# Patient Record
Sex: MALE | Race: WHITE
[De-identification: names, ages, dates, MRNs, and addresses within clinical notes are randomized per-mention and may not be internally consistent; named-entity substitution may affect disease eponyms.]

---

## 2017-11-14 ENCOUNTER — HOSPITAL ENCOUNTER (OUTPATIENT)
Dept: HOSPITAL 25 - ED | Age: 22
Setting detail: OBSERVATION
LOS: 1 days | Discharge: HOME | End: 2017-11-15
Attending: INTERNAL MEDICINE | Admitting: INTERNAL MEDICINE
Payer: COMMERCIAL

## 2017-11-14 DIAGNOSIS — R11.2: ICD-10-CM

## 2017-11-14 DIAGNOSIS — R07.9: ICD-10-CM

## 2017-11-14 DIAGNOSIS — I31.9: Primary | ICD-10-CM

## 2017-11-14 DIAGNOSIS — R06.02: ICD-10-CM

## 2017-11-14 DIAGNOSIS — Z79.899: ICD-10-CM

## 2017-11-14 LAB
ALBUMIN SERPL BCG-MCNC: 4 G/DL (ref 3.2–5.2)
ALP SERPL-CCNC: 62 U/L (ref 34–104)
ALT SERPL W P-5'-P-CCNC: 21 U/L (ref 7–52)
ANION GAP SERPL CALC-SCNC: 8 MMOL/L (ref 2–11)
AST SERPL-CCNC: 99 U/L (ref 13–39)
BUN SERPL-MCNC: 9 MG/DL (ref 6–24)
BUN/CREAT SERPL: 13.4 (ref 8–20)
CALCIUM SERPL-MCNC: 10 MG/DL (ref 8.6–10.3)
CHLORIDE SERPL-SCNC: 99 MMOL/L (ref 101–111)
GLOBULIN SER CALC-MCNC: 3.8 G/DL (ref 2–4)
GLUCOSE SERPL-MCNC: 86 MG/DL (ref 70–100)
HCO3 SERPL-SCNC: 27 MMOL/L (ref 22–32)
HCT VFR BLD AUTO: 43 % (ref 42–52)
HGB BLD-MCNC: 14.5 G/DL (ref 14–18)
LIPASE SERPL-CCNC: 21 U/L (ref 11–82)
MCH RBC QN AUTO: 29 PG (ref 27–31)
MCHC RBC AUTO-ENTMCNC: 34 G/DL (ref 31–36)
MCV RBC AUTO: 84 FL (ref 80–94)
POTASSIUM SERPL-SCNC: 4 MMOL/L (ref 3.5–5)
PROT SERPL-MCNC: 7.8 G/DL (ref 6.4–8.9)
RBC # BLD AUTO: 5.05 10^6/UL (ref 4–5.4)
SODIUM SERPL-SCNC: 134 MMOL/L (ref 133–145)
TROPONIN I SERPL-MCNC: 10.45 NG/ML (ref ?–0.04)
WBC # BLD AUTO: 10.5 10^3/UL (ref 3.5–10.8)

## 2017-11-14 PROCEDURE — 83605 ASSAY OF LACTIC ACID: CPT

## 2017-11-14 PROCEDURE — 99291 CRITICAL CARE FIRST HOUR: CPT

## 2017-11-14 PROCEDURE — 86140 C-REACTIVE PROTEIN: CPT

## 2017-11-14 PROCEDURE — 87502 INFLUENZA DNA AMP PROBE: CPT

## 2017-11-14 PROCEDURE — 96361 HYDRATE IV INFUSION ADD-ON: CPT

## 2017-11-14 PROCEDURE — 93005 ELECTROCARDIOGRAM TRACING: CPT

## 2017-11-14 PROCEDURE — 85379 FIBRIN DEGRADATION QUANT: CPT

## 2017-11-14 PROCEDURE — 80053 COMPREHEN METABOLIC PANEL: CPT

## 2017-11-14 PROCEDURE — 81015 MICROSCOPIC EXAM OF URINE: CPT

## 2017-11-14 PROCEDURE — 96374 THER/PROPH/DIAG INJ IV PUSH: CPT

## 2017-11-14 PROCEDURE — 85025 COMPLETE CBC W/AUTO DIFF WBC: CPT

## 2017-11-14 PROCEDURE — G0378 HOSPITAL OBSERVATION PER HR: HCPCS

## 2017-11-14 PROCEDURE — 83690 ASSAY OF LIPASE: CPT

## 2017-11-14 PROCEDURE — 36415 COLL VENOUS BLD VENIPUNCTURE: CPT

## 2017-11-14 PROCEDURE — 84484 ASSAY OF TROPONIN QUANT: CPT

## 2017-11-14 PROCEDURE — 93306 TTE W/DOPPLER COMPLETE: CPT

## 2017-11-14 PROCEDURE — 81003 URINALYSIS AUTO W/O SCOPE: CPT

## 2017-11-14 RX ADMIN — COLCHICINE SCH MG: 0.6 TABLET, FILM COATED ORAL at 22:20

## 2017-11-14 RX ADMIN — IBUPROFEN SCH MG: 600 TABLET, FILM COATED ORAL at 21:13

## 2017-11-14 NOTE — ED
Lizzy DOHERTY Thomas, scribed for Raf Valencia MD on 11/14/17 at 1534 .





HPI Chest Pain





- HPI Summary


HPI Summary: 





The pt is a 22 y/o M presenting to the ED c/o intermittent waves of chest pain 

that began two days ago. He has mild chest pain in the ED rated 3/10. The pain 

is rated 3/10. He describes the chest pain as similar to the chest pains he 

experienced when he was diagnosed with pericarditis two years ago. The pain is 

aggravated by lying down. The pain is alleviated by sitting up, which is 

similar to his previous pericarditis. The patient has treated the pain with 

nothing PTA. Pt additionally c/o N/V and mild SOB. The patient developed 

myalgia and fatigue four days ago. He is on vyvanse. 





- History of Current Complaint


Chief Complaint: EDChestWallPain


Time Seen by Provider: 11/14/17 15:12


Hx Obtained From: Patient


Onset/Duration: Started Days Ago - 2, Still Present


Timing: Intermittent


Initial Severity: Moderate


Current Severity: Mild


Pain Intensity: 3


Pain Scale Used: 0-10 Numeric


Character: Other: - Simliar to prior pericarditis


Aggravating Factor(s): Other: - Lying down


Alleviating Factor(s): Other: - Sitting up


Associated Signs and Symptoms: Positive: Chest Pain, Shortness of Breath - mild

, Nausea, Vomiting, Other: - Myalgia, fatigue


Related History: Similar Episode/Dx as: - pericarditis two years ago





- Additional Pertinent History


Primary Care Physician: [id number]





- Allergy/Home Medications


Allergies/Adverse Reactions: 


 Allergies











Allergy/AdvReac Type Severity Reaction Status Date / Time


 


No Known Allergies Allergy   Verified 11/14/17 17:25














PMH/Surg Hx/FS Hx/Imm Hx


Previously Healthy: No


Endocrine/Hematology History: 


   Denies: Hx Diabetes


Cardiovascular History: Reports: Other Cardiovascular Problems/Disorders - Hx 

pericarditis


Sensory History: Reports: Hx Contacts or Glasses


Opthamlomology History: Reports: Hx Contacts or Glasses


Neurological History: Reports: Other Neuro Impairments/Disorders - Mild 

concussion


Infectious Disease History: No


Infectious Disease History: 


   Denies: Traveled Outside the [place] in Last 30 Days





- Family History


Known Family History: Positive: Other - Unspecified heart disease, sister





- Social History


Occupation: Student


Lives: Dormitory/Roommates


Alcohol Use: Weekly


Hx Substance Use: No


Substance Use Type: Reports: None


Hx Tobacco Use: No


Smoking Status (MU): Never Smoked Tobacco





Review of Systems


Positive: Fatigue.  Negative: Fever, Chills


Negative: Erythema - eyes


Negative: Sore Throat


Positive: Chest Pain


Positive: Shortness Of Breath - mild


Positive: Vomiting, Nausea.  Negative: Abdominal Pain


Negative: dysuria, hematuria


Positive: Myalgia.  Negative: Edema - legs


Negative: Rash


Neurological: Other - NEGATIVE: dizziness


All Other Systems Reviewed And Are Negative: Yes





Physical Exam





- Summary


Physical Exam Summary: 





Constitutional: Well-developed, Well-nourished, Alert. (-) Distressed


Skin: Warm, Dry


HENT: Normocephalic; Atraumatic 


Eyes: Conjunctiva normal


Neck: Musculoskeletal ROM normal neck. (-) JVD, (-) Stridor, (-) Tracheal 

deviation


Cardio: He has a faint systolic murmur, 2/5. There is a pericardial friction 

rub. Rhythm regular, rate normal, Intact distal pulses; The pedal pulses are 2+ 

and symmetric. Radial pulses are 2+ and symmetric.


Pulmonary/Chest wall: Effort normal. (-) Respiratory distress, (-) Wheezes, (-) 

Rales


Abd: Soft, (-) Tenderness, (-) Distension, (-) Guarding, (-) Rebound


Musculoskeletal: (-) Edema


Lymph: (-) Cervical adenopathy


Neuro: Alert, Oriented x3


Psych: Mood and affect Normal


Triage Information Reviewed: Yes


Vital Signs On Initial Exam: 


 Initial Vitals











Temp Pulse Resp BP Pulse Ox


 


 97.8 F   78   20   120/61   97 


 


 11/14/17 14:52  11/14/17 14:52  11/14/17 14:52  11/14/17 14:52  11/14/17 14:52











Vital Signs Reviewed: Yes





- Thurmond Coma Scale


Coma Scale Total: 15





Diagnostics





- Vital Signs


 Vital Signs











  Temp Pulse Resp BP Pulse Ox


 


 11/14/17 15:07   76  18  101/75  97


 


 11/14/17 15:04   76  16   98


 


 11/14/17 15:02     91/32 


 


 11/14/17 14:52  97.8 F  78  20  120/61  97














- Laboratory


Lab Results: 


 Lab Results











  11/14/17 11/14/17 11/14/17 Range/Units





  15:53 15:53 15:53 


 


WBC    10.5  (3.5-10.8)  10^3/ul


 


RBC    5.05  (4.0-5.4)  10^6/ul


 


Hgb    14.5  (14.0-18.0)  g/dl


 


Hct    43  (42-52)  %


 


MCV    84  (80-94)  fL


 


MCH    29  (27-31)  pg


 


MCHC    34  (31-36)  g/dl


 


RDW    13  (10.5-15)  %


 


Plt Count    218  (150-450)  10^3/ul


 


MPV    8  (7.4-10.4)  um3


 


Neut % (Auto)    79.7  (38-83)  %


 


Lymph % (Auto)    11.8 L  (25-47)  %


 


Mono % (Auto)    7.4  (1-9)  %


 


Eos % (Auto)    0.6  (0-6)  %


 


Baso % (Auto)    0.5  (0-2)  %


 


Absolute Neuts (auto)    8.3 H  (1.5-7.7)  10^3/ul


 


Absolute Lymphs (auto)    1.2  (1.0-4.8)  10^3/ul


 


Absolute Monos (auto)    0.8  (0-0.8)  10^3/ul


 


Absolute Eos (auto)    0.1  (0-0.6)  10^3/ul


 


Absolute Basos (auto)    0.1  (0-0.2)  10^3/ul


 


Absolute Nucleated RBC    0  10^3/ul


 


Nucleated RBC %    0  


 


D-Dimer, Quantitative  < 200    (Less Than 230)  ng/mL


 


Sodium   134   (133-145)  mmol/L


 


Potassium   4.0   (3.5-5.0)  mmol/L


 


Chloride   99 L   (101-111)  mmol/L


 


Carbon Dioxide   27   (22-32)  mmol/L


 


Anion Gap   8   (2-11)  mmol/L


 


BUN   9   (6-24)  mg/dL


 


Creatinine   0.67   (0.67-1.17)  mg/dL


 


Est GFR ( Amer)   194.5   (>60)  


 


Est GFR (Non-Af Amer)   151.2   (>60)  


 


BUN/Creatinine Ratio   13.4   (8-20)  


 


Glucose   86   ()  mg/dL


 


Lactic Acid     (0.5-2.0)  mmol/L


 


Calcium   10.0   (8.6-10.3)  mg/dL


 


Total Bilirubin   0.70   (0.2-1.0)  mg/dL


 


AST   99 H   (13-39)  U/L


 


ALT   21   (7-52)  U/L


 


Alkaline Phosphatase   62   ()  U/L


 


Troponin I   10.45 H*   (<0.04)  ng/mL


 


C-Reactive Protein   154.05 H   (< 5.00)  mg/L


 


Total Protein   7.8   (6.4-8.9)  g/dL


 


Albumin   4.0   (3.2-5.2)  g/dL


 


Globulin   3.8   (2-4)  g/dL


 


Albumin/Globulin Ratio   1.1   (1-3)  


 


Lipase   21   (11.0-82.0)  U/L


 


Influenza A (Rapid)     (Negative)  


 


Influenza B (Rapid)     (Negative)  














  11/14/17 11/14/17 Range/Units





  15:53 16:14 


 


WBC    (3.5-10.8)  10^3/ul


 


RBC    (4.0-5.4)  10^6/ul


 


Hgb    (14.0-18.0)  g/dl


 


Hct    (42-52)  %


 


MCV    (80-94)  fL


 


MCH    (27-31)  pg


 


MCHC    (31-36)  g/dl


 


RDW    (10.5-15)  %


 


Plt Count    (150-450)  10^3/ul


 


MPV    (7.4-10.4)  um3


 


Neut % (Auto)    (38-83)  %


 


Lymph % (Auto)    (25-47)  %


 


Mono % (Auto)    (1-9)  %


 


Eos % (Auto)    (0-6)  %


 


Baso % (Auto)    (0-2)  %


 


Absolute Neuts (auto)    (1.5-7.7)  10^3/ul


 


Absolute Lymphs (auto)    (1.0-4.8)  10^3/ul


 


Absolute Monos (auto)    (0-0.8)  10^3/ul


 


Absolute Eos (auto)    (0-0.6)  10^3/ul


 


Absolute Basos (auto)    (0-0.2)  10^3/ul


 


Absolute Nucleated RBC    10^3/ul


 


Nucleated RBC %    


 


D-Dimer, Quantitative    (Less Than 230)  ng/mL


 


Sodium    (133-145)  mmol/L


 


Potassium    (3.5-5.0)  mmol/L


 


Chloride    (101-111)  mmol/L


 


Carbon Dioxide    (22-32)  mmol/L


 


Anion Gap    (2-11)  mmol/L


 


BUN    (6-24)  mg/dL


 


Creatinine    (0.67-1.17)  mg/dL


 


Est GFR ( Amer)    (>60)  


 


Est GFR (Non-Af Amer)    (>60)  


 


BUN/Creatinine Ratio    (8-20)  


 


Glucose    ()  mg/dL


 


Lactic Acid  0.8   (0.5-2.0)  mmol/L


 


Calcium    (8.6-10.3)  mg/dL


 


Total Bilirubin    (0.2-1.0)  mg/dL


 


AST    (13-39)  U/L


 


ALT    (7-52)  U/L


 


Alkaline Phosphatase    ()  U/L


 


Troponin I    (<0.04)  ng/mL


 


C-Reactive Protein    (< 5.00)  mg/L


 


Total Protein    (6.4-8.9)  g/dL


 


Albumin    (3.2-5.2)  g/dL


 


Globulin    (2-4)  g/dL


 


Albumin/Globulin Ratio    (1-3)  


 


Lipase    (11.0-82.0)  U/L


 


Influenza A (Rapid)   Negative  (Negative)  


 


Influenza B (Rapid)   Negative  (Negative)  











Result Diagrams: 


 11/14/17 15:53





 11/14/17 15:53


Lab Statement: Any lab studies that have been ordered have been reviewed, and 

results considered in the medical decision making process.





- EKG


  ** 16:10


Cardiac Rate: NL


EKG Interpretation: 78 BPM. No STEMI. 





  ** 16:42


Cardiac Rate: NL


EKG Rhythm: Sinus Rhythm


EKG Interpretation: 78 BPM. No STEMI. 





  ** 18:18


Cardiac Rate: NL


EKG Interpretation: 77 BPM. No STEMI. ST elevations in I, AVL. 





Chest Pain Course/Dx





- Course


Assessment/Plan: The pt is a 22 y/o M presenting to the ED c/o intermittent 

waves of chest pain that began two days ago. He has mild chest pain in the ED 

rated 3/10. The pain is rated 3/10. He describes the chest pain as similar to 

the chest pains he experienced when he was diagnosed with pericarditis two 

years ago. The pain is aggravated by lying down. The pain is alleviated by 

sitting up, which is similar to his previous pericarditis. The patient has 

treated the pain with nothing PTA. Pt additionally c/o N/V and mild SOB. The 

patient developed myalgia and fatigue four days ago. He is on vyvanse.  In the 

ED course the patient was given ASA, Colchicine, IV fluids, and Zofran. 

Bloodwork shows troponin 10.45 and . EKGs show sinus rhythm with no 

STEMI. I consulted with Dr. Rivera and Dr. Johnson, who offered patient care 

recommendations. Dr. Johnson will come to evaluate the patient. The patient is 

diagnosed with myopericarditis. The patient will be admitted by the 

hospitalists. 60 minutes critical care time.





- Diagnoses


Provider Diagnoses: 


 Acute myopericarditis








- Provider Notifications


Discussed Care Of Patient With: Mathew Rivera


Time Discussed With Above Provider: 17:38


Instructed by Provider To: Other - I consulted with Dr. Rivera, hospitalist, 

who will admit the patient. I also consulted with Dr. Johnson, cardiology, who 

made recommendations regarding patient care. He will come to evaluate the 

patient.





- Critical Care Time


Critical Care Time: 30-74 min - 60 minutes





Discharge





- Discharge Plan


Condition: Good


Disposition: ADMITTED TO [facility]





The documentation as recorded by the Lizzy maldonado Thomas accurately reflects 

the service I personally performed and the decisions made by me, Raf Valencia MD.

## 2017-11-14 NOTE — HP
ADMISSION HISTORY AND PHYSICAL:

 

DATE OF ADMISSION:  11/14/17

 

PRIMARY CARE PROVIDER:  The patient is unclear; he has one in [place], as well 
as follows with [facility].

 

HEALTHCARE PROXY:  He identifies as his mother and his father.

 

CODE STATUS:  Full.

 

SOURCE OF INFORMATION:  History obtained from interview with patient, review of 
past medical records.

 

RELIABILITY:  Good.

 

CHIEF COMPLAINT:  Chest pain.

 

HISTORY OF PRESENT ILLNESS:  This 21-year-old man with past medical history of 
myopericarditis in December of 2015, who has been in his usual state of health 
until 3 weeks prior to presentation started feeling sick and "ill" described as 
body aches all over, fatigue, increased sleep, subjective fevers in the absence 
of sore throat, cough, rhinorrhea, shortness of breath.  He started to notice 
chest pain 2 days prior to presentation that was similar to his presentation of 
myopericarditis in December of 2015.  The night prior to presentation, his 
chest pain, which was substernal radiating into his left arm, increased in 
severity and he has had difficulty sleeping.  He notes the pain is sharp and 
comes in waves, not positional and not pleuritic.  He notes that the pain was 
less painful in 2015; however, it was quite severe and continued today for 
which he sought care in the emergency room.  He notes no direct sick contacts, 
although notes he is living at [facility] and many people have been ill.

 

PAST MEDICAL HISTORY:  Myopericarditis, December of 2015.

 

PAST SURGICAL HISTORY:  No history of surgery.

 

MEDICATIONS:  Vyvanse prescription; however, does not take.

 

ALLERGIES:  No known drug allergies.

 

FAMILY HISTORY:  Mother with Meniere's disease, asthma, and hyperlipidemia.  
Father with no significant medical history.

 

SOCIAL HISTORY:  Occasional marijuana, 1 time per week.  Social alcohol 
drinking on the weekend.  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  No tobacco.

 

REVIEW OF SYSTEMS:  As per HPI, otherwise all other systems negative.

 

                               PHYSICAL EXAMINATION

 

GENERAL:  Well-appearing male, sitting up in bed, interactive, pleasant, in no 
apparent distress.

 

VITAL SIGNS:  In the emergency room, T-max 97.8.  Blood pressure 106/71, heart 
rate 76, respiratory rate is 18, 99% on room air.

 

HEENT:  Oropharynx is clear.  Moist mucous membranes.  Sclerae are anicteric.

 

NECK:  Non-elevated JVD.  He has no cervical or supraclavicular lymphadenopathy.

 

LUNGS:  Clear to auscultation bilaterally.

 

HEART:  Regular rate and rhythm.  No murmurs, rubs, or gallops.

 

ABDOMEN:  Soft, nontender, nondistended.

 

EXTREMITIES:  Warm and well perfused.  2+ peripheral pulses.  No clubbing, 
cyanosis, or edema.

 

NEUROLOGIC:  He is alert and oriented x3.  His cranial nerves II through XII 
are intact.  He has no apparent anxiety, agitation, or depression.

 

 DIAGNOSTIC STUDIES/LAB DATA:  Pertinent laboratory data reviewed:  AST 99, ALT 
21, alk phos 62.  Troponin I 10.45 with CRP of 154.  Influenza A and B are 
negative.  D- dimer is less than 200.

 

EKG:  Normal sinus rhythm, normal limit axis, nonspecific T-wave changes, V2 
through V6. ST depression isolated in V3, aVF.  J-point elevation, aVL.

 

ASSESSMENT AND PLAN:  This is a 21-year-old man with a past medical history of 
myopericarditis 2 years prior to presentation, presenting to the hospital with 
similar chest pain to his previous episode of myopericarditis.  He was found to 
have elevated troponin.

 

1.  Chest pain.  Suspect in the setting of myopericarditis again.  The patient 
improved with colchicine and aspirin.  We will continue colchicine b.i.d., 
Motrin 3 times a day standing.  Check transthoracic echocardiogram tomorrow.  
Trend troponins until downtrending.

2.  DVT prophylaxis.  Low risk, ambulate ad maria l.

3.  FEN.  Vegetarian diet.

 

[id number]/CPS #: [id number]

[facility]

## 2017-11-15 VITALS — DIASTOLIC BLOOD PRESSURE: 64 MMHG | SYSTOLIC BLOOD PRESSURE: 107 MMHG

## 2017-11-15 RX ADMIN — IBUPROFEN SCH MG: 600 TABLET, FILM COATED ORAL at 11:06

## 2017-11-15 RX ADMIN — COLCHICINE SCH MG: 0.6 TABLET, FILM COATED ORAL at 08:26

## 2017-11-15 RX ADMIN — IBUPROFEN SCH MG: 600 TABLET, FILM COATED ORAL at 04:06

## 2017-11-15 NOTE — ECHO
Patient:      ENMANUEL BRODY

[facility] Rec#:     [id number]            :          1995          

Date:         11/15/2017            Age:          21y                 

Account#:     [id number]          Height:       177.8 cm / 70.0 in

Accession#:   [id number]           Weight:       88.45 kg / 194.9 lbs

Sex:          M                     BSA:          2.06

Room#:        438                   

Admit Date#:  2017          

Type:         Inpatient

 

Referring:    Mathew Rivera MD

Reading:      Linda English MD

Sonographer:  Yana CardosoRDCS,RDMS

______________________________________________________________________

 

Transthoracic Echocardiogram

 

Indication:

CP

BP:           108/67

HR:           70

Rhythm:       NSR

 

Findings     

History:

Myopericarditis 

 

Technical Comments:

The study quality is good.  

 

Left Ventricle:

The left ventricular chamber size is normal. Mild concentric left

ventricular hypertrophy is observed. Left ventricular systolic function

is at the lower limits of normal.  The estimated ejection fraction is

45-50%.  Normal left ventricular diastolic filling is observed. 

 

Left Atrium:

The left atrium is mildly dilated. 

 

Right Ventricle:

The right ventricular chamber size and systolic function are within

normal limits. 

 

Right Atrium:

The right atrium is mildly dilated.  

 

Aortic Valve:

The aortic valve is trileaflet. There is no evidence of aortic valve

thickening. Systolic excursion of the aortic valve is normal. There is

no evidence of aortic regurgitation. There is no evidence of aortic

stenosis. 

 

Mitral Valve:

The mitral valve leaflets appear normal. There is a trace of mitral

regurgitation. There is no evidence of mitral stenosis. 

 

Tricuspid Valve:

The tricuspid valve leaflets are normal.  There is trace tricuspid

regurgitation.  No pulmonary hypertension is noted. 

 

Pulmonic Valve:

The pulmonic valve appears normal. There is a trace pulmonic

regurgitation.  

 

Pericardium:

There is no significant pericardial effusion. 

 

Aorta:

The aortic root appears normal. There is no dilatation of the aortic

arch. 

 

Pulmonary Artery:

The main pulmonary artery appears normal. 

 

Venous:

The inferior vena cava appears normal in size. There is a greater than

50% respiratory change in the inferior vena cava dimension. 

 

Conclusions

Mild concentric left ventricular hypertrophy is observed.

Left ventricular systolic function is at the lower limits of normal. 

The estimated ejection fraction is 50%. 

Normal left ventricular diastolic filling is observed.

The right ventricular chamber size and systolic function are within

normal limits.

There is a trace of mitral regurgitation.

There is trace tricuspid regurgitation. 

No pulmonary hypertension is noted.

There is no significant pericardial effusion.

Compared with prior echo of 12/8/15, EF is stable, valve function is

stable.  Pericardium is stable.

 

Measurements     

Name                    Value         Normal Range            

RVIDd (AP) 2D           2.2 cm        (0.9 - 2.6)             

RVDdMajor (2D)          3.5 cm        (2.2 - 4.4)             

RAd ISD 4CH             5 cm          (3.4 - 4.9)             

RA (A4C)W               4.4 cm        (2.9 - 4.6)             

IVSd (2D)               1.2 cm        (0.6 - 1)               

LVPWd (2D)              1.3 cm        (0.6 - 1)               

LVIDd (2D)              5.3 cm        (3.6 - 5.4)             

LVIDs (2D)              4.1 cm        -                        

LV FS (2D)              22 %          (25 - 45)               

Aortic Annulus          2.2 cm        (1.4 - 2.6)             

Ao root diameter (2D)   2.9 cm        (2.1 - 3.5)             

Ascending Ao            2.4 cm        (2.1 - 3.4)             

Aortic arch             2.2 cm        (1.8 - 3.4)             

LA dimension (AP) 2D    3.6 cm        (2.3 - 3.8)             

LAd ISD 4CH             5.5 cm        (2.9 - 5.3)             

LA ISD 4CH W            4.5 cm        (2.5 - 4.5)             

 

Name                    Value         Normal Range            

LA ESV SP 4CH (A/L)     67.64 ml      -                        

LA ESV SP 2CH (A/L)     75.89 ml      -                        

LA ESV BP (A/L)         75.62 ml      -                        

LA ESV BP (A/L) index   37 ml/m2      -                        

LA ESV SP 4CH (MOD)     61.69 ml      -                        

LA ESV SP 2CH (MOD)     71.26 ml      -                        

 

Name                    Value         Normal Range            

MV E-wave Vmax          0.7 m/sec     -                        

MV deceleration time    178 msec      -                        

MV A-wave Vmax          0.3 m/sec     -                        

MV E:A ratio            2.4 ratio     -                        

P. vein S-wave Vmax     0.5 m/sec     -                        

P. vein D-wave Vmax     0.5 m/sec     -                        

P. vein S:D Vmax ratio  0.9 ratio     -                        

P. vein A-wave duration 100 msec      -                        

LV lateral e' Vmax      0.1 m/sec     -                        

LV E:e' lateral ratio   7 ratio       -                        

 

Name                    Value         Normal Range            

AV Vmax                 1.2 m/sec     -                        

AV VTI                  25 cm         -                        

AV peak gradient        6 mmHg        -                        

AV mean gradient        3.1 mmHg      -                        

LVOT Vmax               0.9 m/sec     -                        

LVOT VTI                21 cm         -                        

LVOT peak gradient      3.2 mmHg      -                        

LVOT mean gradient      2 mmHg        -                        

MARCIA Vmax                1 m/sec       -                        

 

Name                    Value         Normal Range            

TR Vmax                 2.4 m/sec     -                        

TR peak gradient        23 mmHg       -                        

RAP                     3 mmHg        -                        

RVSP                    26 mmHg       -                        

IVC diameter            1.9 cm        -                        

 

Name                    Value         Normal Range            

PV Vmax                 0.7 m/sec     -                        

PV peak gradient        2 mmHg        -                        

 

Electronically signed by: Linda English MD on 11/15/2017 09:54:59

## 2017-11-16 NOTE — DS
CC:  Dr. Laughlin *

 

DISCHARGE SUMMARY:

 

DATE OF ADMISSION:  11/14/17

 

DATE OF DISCHARGE:  11/15/17

 

PRIMARY CARE PHYSICIAN:  Patient plans on establishing at [facility].

 

Copying Dr. Laughlin, so records are sent to [facility], although patient is not yet 
established with the practice.  His parents called after leaving the  hospital 
and asked for recommendations with whom to establish.

 

PRIMARY DIAGNOSIS:  Myopericarditis.

 

SECONDARY DIAGNOSES:  Include none.

 

HISTORY OF PRESENT ILLNESS AND HOSPITAL COURSE:  This is a 21-year-old man 
turns 22 in less than a month with past medical history of myopericarditis in 
2015 after presenting with chest pain and elevated troponin I elevation in the 
setting of a recent viral illness, who had been in his usual state of health 
until one week prior to presentation.  Again, started developing a viral 
illness associated with body aches, generalized malaise, fatigue, subjective 
fevers, followed by the onset of intermittent chest pain that increasingly 
became worse until he presented to the hospital.  In the emergency room, he was 
found with an elevated troponin increased to a peak of 21.6 with EKG changes 
suggestive of pericarditis, although also myopericarditis with ST depression.  
The patient was started on colchicine as well as Motrin with resolution of 
chest pain prior to discharge.  A transthoracic echocardiogram, which was 
unchanged from his admission in 2015.  His EF in 2015 was 50% and remains 
preserved at 50% at this time as well.  Trace MR, trace TR.  No pulmonary 
hypertension.  His pericardium was stable.  With improvement on the colchicine 
and Motrin, the patient was discharged with extensive counseling concerning 
followup.  Additionally, I spoke with his parents via the telephone that the 
patient gave me consent prior to his discharge to speak with his family.  The 
patient will be continued on colchicine for 6 months, which I emphasized the 
importance.  He needs a CRP checked in 2 weeks prior to discontinuation of 
Motrin with the Motrin to be adjusted based on CRP valve and patient's 
symptoms.  He is to avoid exercise or heavy activity for the next 3 months, 
avoid alcohol as well in the setting of high dose NSAIDs.  I cautioned about 
concerning symptoms that would be present with tamponade including shortness of 
breath, lightheadedness, chest pain, increasing fatigue, loss of consciousness.
  He acknowledged understanding. These symptoms were again reviewed over the 
telephone with his parents.  I suspect patient had recurrent myopericarditis, 
although this time also set off in the setting of recent viral illness.

 

MEDICATIONS ON DISCHARGE:  Include:

1.  Colchicine 0.6 mg twice daily for 6 months.

2.  Motrin 600 mg 3 times a day for 2 weeks and then to be adjusted based on 
CRP. CRP at presentation was 154.

3.  Omeprazole 20 mg daily for GI prophylaxis, can be discontinued after Motrin 
has been discontinued.

 

At followup, please:

1.  Follow CRP value, adjust Motrin as necessary.

2.  No other specific labs or vitals that need followup.

 

Reasons to return to the hospital included, but not limited to recurrent or 
worsening symptoms including worsening chest pain, shortness of breath, nausea, 
vomiting, lightheadedness, loss of consciousness, near loss of consciousness, 
bleeding from any source were discussed with the patient and his family, they 
acknowledged understanding.

 

TIME SPENT:  Greater than 60 minutes was spent discharging the patient; greater 
than half was spent face-to-face with the patient.

 

 [id number]/[facility] #: [id number]

MTDD

## 2019-04-30 ENCOUNTER — HOSPITAL ENCOUNTER (EMERGENCY)
Dept: HOSPITAL 25 - ED | Age: 24
Discharge: HOME | End: 2019-04-30
Payer: COMMERCIAL

## 2019-04-30 DIAGNOSIS — S46.911A: Primary | ICD-10-CM
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
## 2019-04-30 PROCEDURE — 99281 EMR DPT VST MAYX REQ PHY/QHP: CPT

## 2019-04-30 NOTE — ED
Upper Extremity Pain





- HPI Summary


HPI Summary: 


The patient is a 23 year old male who is presenting to the [facility] with a chief 

complaint of right shoulder pain. 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 

player and an outfielder. He describes that he had not thrown around much prior 

to 4/29/19 (Yesterday) and had attempted to stretch his upper extremities 

leading to a discomfort. Subsequently after, the patient had heard a "snap" he 

states that the pain had significantly worsened. The onset of the "snap" and 

significant worsening of the pain was 2/3 hours ago prior to [facility] arrival. The 

pain has currently radiated from the shoulder to the upper portion of the 

biceps. The symptoms are aggravated by movement. The symptoms are alleviated by 

nothing. Pain is rated to be 10/10 in severity.





- History of Current Complaint


Chief Complaint: EDExtremityUpper


Stated Complaint: "R SHOULDER PAIN AND NUMBNESS" PER PT


Time Seen by Provider: 04/30/19 04:30


Hx Obtained From: Patient


Onset/Duration: Started Hours Ago


Timing: Constant


Severity Initially: Severe


Severity Currently: Severe


Pain Location: Shoulder


Aggravating Factor(s): Movement


Alleviating Factor(s): Nothing





- Allergies/Home Medications


Allergies/Adverse Reactions: 


 Allergies











Allergy/AdvReac Type Severity Reaction Status Date / Time


 


No Known Allergies Allergy   Verified 04/30/19 03:41











Home Medications: 


 Home Medications





Dextroamphetamine/Amphetamine [Dextroamp-Amphetamin 20 mg Tab] 20 mg PO DAILY 04 /30/19 [History Confirmed 04/30/19]











PMH/Surg Hx/FS Hx/Imm Hx


Endocrine/Hematology History: 


   Denies: Hx Diabetes


Cardiovascular History: Reports: Other Cardiovascular Problems/Disorders - Hx 

pericarditis


Sensory History: Reports: Hx Contacts or Glasses


   Denies: Hx Hearing Aid


Opthamlomology History: Reports: Hx Contacts or Glasses


Neurological History: Reports: Other Neuro Impairments/Disorders - Mild 

concussion


Infectious Disease History: No


Infectious Disease History: 


   Denies: Traveled Outside the [place] in Last 30 Days





- Family History


Known Family History: Positive: Other - Unspecified heart disease, sister





- Social History


Alcohol Use: Weekly


Hx Substance Use: No


Substance Use Type: Reports: None


Hx Tobacco Use: No


Smoking Status (MU): Never Smoked Tobacco


Type: Cigarettes





Review of Systems


Constitutional: Negative


Eyes: Negative


ENT: Negative


Cardiovascular: Negative


Respiratory: Negative


Gastrointestinal: Negative


Genitourinary: Negative


Musculoskeletal: Other - Right shoulder pain; Right upper biceps pain


Positive: Decreased ROM - Right Shoulder


Skin: Negative


Neurological: Negative


Psychological: Normal


All Other Systems Reviewed And Are Negative: Yes





Physical Exam





- Summary


Physical Exam Summary: 


Appearance: Well-appearing, Well-nourished, lying in bed comfortable


Skin: Warm, dry, no obvious rash


Eyes: sclera anicteric, no conjunctival pallor


ENT: mucous membranes moist


Neck: deferred


Respiratory: No signs of respiratory distress


Cardiovascular: Appears well perfused, pulses are nml


Abdomen: deferred


Musculoskeletal: Right shoulder is tender at the origin of the biceps; No 

deformity; Limited right shoulder ROM; No weakness at hand or elbow. Good ROM 

at elbow and wrist


Neurological: Awake and alert, mentation is normal, speech is fluent and 

appropriate


Psychiatric: affect is normal, does not appear anxious or depressed


Triage Information Reviewed: Yes


Vital Signs On Initial Exam: 


 Initial Vitals











Temp Pulse Resp BP Pulse Ox


 


 97.8 F   74   16   125/78   98 


 


 04/30/19 03:38  04/30/19 03:38  04/30/19 03:38  04/30/19 03:38  04/30/19 03:38











Vital Signs Reviewed: Yes





Diagnostics





- Vital Signs


 Vital Signs











  Temp Pulse Resp BP Pulse Ox


 


 04/30/19 05:06  0 F  0  0  0/0  0


 


 04/30/19 03:38  97.8 F  74  16  125/78  98














- Laboratory


Lab Statement: Any lab studies that have been ordered have been reviewed, and 

results considered in the medical decision making process.





Course/Dx





- Course


Course Of Treatment: The patient is a 23 M who is presenting to the [facility] with 

a chief complaint of right shoulder pain. The patient also reports the he had 

heard a "snap" when stretching upper extremities at the area of discomfort 

leading to more discomfort. There is decreased ROM about the right shoulder. We 

discussed with the patient about medications that would relieve the pain and 

relax the muscles. Physical exam showed remarkable findings of decreased ROM at 

the right shoulder. The patient will be D/C home with a dx of right shoulder 

strain.





- Diagnoses


Provider Diagnoses: 


 Right shoulder strain








Discharge





- Sign-Out/Discharge


Documenting (check all that apply): Patient Departure - Discharge Home


Patient Received Moderate/Deep Sedation with Procedure: No





- Discharge Plan


Condition: Stable


Disposition: HOME


Patient Education Materials:  Shoulder Sprain (ED)


Referrals: 


Mathew Mathews MD [Medical Doctor] - As Soon As Possible





- Billing Disposition and Condition


Condition: STABLE


Disposition: Home





- Attestation Statements


Document Initiated by Bhakti: Yes


Documenting Scribe: Alverto Sorenson


Provider For Whom Bhakti is Documenting (Include Credential): Dr. Fransisco Traore


Scribe Attestation: 


Alverto DOHERTY, scribed for Dr. Fransisco Traore on 05/03/19 at 1913. 


Scribe Documentation Reviewed: Yes


Provider Attestation: 


The documentation as recorded by the johnibe, Alverto Sorenson accurately reflects 

the service I personally performed and the decisions made by me, Dr. Fransisco Traore


Status of Scribe Document: Viewed